# Patient Record
(demographics unavailable — no encounter records)

---

## 2025-03-11 NOTE — PHYSICAL EXAM
[No Acute Distress] : no acute distress [No Resp Distress] : no resp distress [No Acc Muscle Use] : no acc muscle use [Clear to Auscultation Bilaterally] : clear to auscultation bilaterally

## 2025-03-14 NOTE — PROCEDURE
[FreeTextEntry1] : 3/2025 severe mixed obstruction/restriction with bronchodilator response in [employer] better rv/tlc, reduced dlco that corrects    previous data reviewed:  severe obstruction on spirometry wo significant bronchodilator response. rv/tlc ratio high. severe dlco reduced 8/2024- CT chest noncontrast [facility]  PROCEDURE DATE:  05/09/2024    INTERPRETATION:  CLINICAL INFORMATION: History of pulmonary nodule, follow-up examination  COMPARISON: March 15, 2023  CONTRAST/COMPLICATIONS: IV Contrast: NONE Oral Contrast: NONE Complications: None reported at time of study completion  PROCEDURE: CT scan of the chest was obtained without intravenous contrast. Axial, coronal, sagittal and MIP images were produced and reviewed  FINDINGS:  LYMPH NODES: No enlarged mediastinal, hilar or axillary lymph nodes  HEART/VASCULATURE: No pericardial effusion. Thoracic aorta and main pulmonary artery are unchanged.  AIRWAYS/LUNGS/PLEURA: Trachea and central airways are patent. Diffuse emphysematous changes bilaterally. No parenchymal consolidation, pleural effusion or pneumothorax. No bronchiectasis or honeycombing.  Left basilar 1.2 cm partially spiculated density (series 4 image 880), new from prior. Left upper lobe 0.3 cm nodule (series 4 image 179), new from prior. Stable scattered additional bilateral nodules including right middle lobe 0.6 cm nodule (series 4 image 649).  UPPER ABDOMEN: Partially imaged upper abdomen demonstrates no acute abnormality. Pneumobilia present. Dilated CBD, unchanged.  BONES/SOFT TISSUES: No acute osseous abnormality  IMPRESSION:  Since March 15, 2023;  Left basilar 1.2 cm partially spiculated density, possibly atelectasis, new from prior. Left upper lobe 0.3 cm nodule, new from prior.  Consider short interval follow-up (3-6 months) to confirm stability/resolution.  --- End of Report ---  4/2024- severe copd  ______________________________   CONCLUSIONS:    1. Left ventricular cavity is normal in size. Left ventricular systolic function is normal with an ejection fraction of 57 % by Hernandez's method of disks.  2. Normal left ventricular diastolic function.  3. Normal right ventricular cavity size and normal systolic function. Tricuspid annular plane systolic excursion (TAPSE) is 2.0 cm (normal >=1.7 cm).  4. No significant valvular disease.  5. No pericardial effusion seen.  6. No prior echocardiogram is available for comparison.  ACC: [id number] EXAM:  XR CHEST AP OR PA 1V   ORDERED BY: ADILSON GUPTA   PROCEDURE DATE:  03/12/2024      INTERPRETATION:  CLINICAL INFORMATION: Shortness of breath.  TECHNIQUE: Frontal view of the chest.  COMPARISON: Multiple prior chest x-rays with most recent dated 3/15/2023.  CT chest 3/15/2023.  FINDINGS:  Heart: Heart size is normal. Pulmonary: No focal consolidation. Emphysematous changes with relative  paucity of lung markings in the right upper lung. No pneumothorax or  pleural effusion. Bones: No acute bony pathology.  IMPRESSION: No focal consolidation. Emphysema.    --- End of Report ---     KIRBY BAILON MD; Resident Radiologist This document has been electronically signed. MARGO BATISTA MD; Attending Radiologist This document has been electronically signed. Mar 12 2024  6:00PM previous data reviewed:  the labs were drawn in the office today pfts sept 2023- mixed obstruction/ restriction severe, air trapping, reduced dlco  CONCLUSIONS: 1. Normal mitral valve. Minimal mitral regurgitation. 2. Normal left ventricular internal dimensions and wall thicknesses. 3. Endocardium not well visualized; grossly normal left ventricular systolic function.  Endocardial visualization enhanced with intravenous injection of echo contrast (Definity). 4. Right ventricular enlargement with decreased right ventricular systolic function. 5. Inadequate tricuspid regurgitation Doppler envelope precludes estimation of RVSP.   < from: CT Angio Chest PE Protocol w/ IV Cont (03.15.23 @ 23:49) >  ACC: [id number] EXAM:  CT ANGIO CHEST PULM [facility]   ORDERED BY: ANALI WORTHY   PROCEDURE DATE:  03/15/2023      INTERPRETATION:  CLINICAL INFORMATION: Dyspnea.  COMPARISON: CTA chest 9/18/2020, CT chest 9/17/2018  CONTRAST/COMPLICATIONS: IV Contrast: Omnipaque 350  70 cc administered   30 cc discarded Oral Contrast: NONE Complications: None reported at time of study completion  PROCEDURE: CT Angiography of the Chest. Sagittal and coronal reformats were performed as well as 3D (MIP)  reconstructions.  FINDINGS:  LUNGS AND AIRWAYS: Patent central airways.  Emphysema. Few bilateral lung  nodules are unchanged from 9/17/2018. For example a right middle lobe  solid nodule (301, 86) measures 6 mm. PLEURA: No pleural effusion. MEDIASTINUM AND ROBERT: No lymphadenopathy. VESSELS: No pulmonary embolism. Reflux of contrast into the intrahepatic  IVC and hepatic veins HEART: Heart size is normal. No pericardial effusion. CHEST WALL AND LOWER NECK: Within normal limits. VISUALIZED UPPER ABDOMEN: Moderate intrahepatic biliary ductal dilation  and marked extrahepatic biliary ductal dilation with the common bile duct  measuring up to 2.0 cm in diameter, previously 1.6 cm. BONES: Degenerative changes.  IMPRESSION: No pulmonary embolism.  Reflux of contrast into the intrahepatic IVC and hepatic veins, which  reflects some degree of elevated right heart pressures.  Centrilobular emphysema.  Dilated CBD up to 2.0 cm, previously measuring 1.6 cm 9/17/2018. MRI/MRCP  can be performed for further evaluation.  --- End of Report ---   RHC with mildly elevated PASP, normal filling pressures, normal LHC  Patient has mild pulmonary hypertension with normal wedge pressure CO2 retention on ABG and normal coronaries Recommendations:  Patient has mild pulmonary hypertension with normal wedge pressure CO2 retention on ABG and normal coronaries Recommendations:     Patient has mild pulmonary hypertension with normal wedge pressure CO2 retention on ABG and normal coronaries Recommendations:   Medical therapy for COPD/emphysema and mild pulmonary hypertension. Cessation of smoking   Patient has mild pulmonary hypertension with normal wedge pressure CO2 retention on ABG and normal coronaries Recommendations:   Medical therapy for COPD/emphysema and mild pulmonary hypertension. Cessation of smoking

## 2025-03-14 NOTE — HISTORY OF PRESENT ILLNESS
[Current] : current [Never] : never [TextBox_4] : YANELIS KOENIG is a 51 year old female who presents for f/u  on hypercarbic resp failure on 24/ 02 - 2L was in [facility] from 3/12- 3/29/24 was in [facility] summer 2024 for COPD  has 2L 02 24/7 community surgical dme-      on trelegy back on [facility]   still smoking    not using vent at night

## 2025-03-14 NOTE — PROCEDURE
[FreeTextEntry1] : 3/2025 severe mixed obstruction/restriction with bronchodilator response in [employer] better rv/tlc, reduced dlco that corrects    previous data reviewed:  severe obstruction on spirometry wo significant bronchodilator response. rv/tlc ratio high. severe dlco reduced 8/2024- CT chest noncontrast [facility]ENRI  PROCEDURE DATE:  05/09/2024    INTERPRETATION:  CLINICAL INFORMATION: History of pulmonary nodule, follow-up examination  COMPARISON: March 15, 2023  CONTRAST/COMPLICATIONS: IV Contrast: NONE Oral Contrast: NONE Complications: None reported at time of study completion  PROCEDURE: CT scan of the chest was obtained without intravenous contrast. Axial, coronal, sagittal and MIP images were produced and reviewed  FINDINGS:  LYMPH NODES: No enlarged mediastinal, hilar or axillary lymph nodes  HEART/VASCULATURE: No pericardial effusion. Thoracic aorta and main pulmonary artery are unchanged.  AIRWAYS/LUNGS/PLEURA: Trachea and central airways are patent. Diffuse emphysematous changes bilaterally. No parenchymal consolidation, pleural effusion or pneumothorax. No bronchiectasis or honeycombing.  Left basilar 1.2 cm partially spiculated density (series 4 image 880), new from prior. Left upper lobe 0.3 cm nodule (series 4 image 179), new from prior. Stable scattered additional bilateral nodules including right middle lobe 0.6 cm nodule (series 4 image 649).  UPPER ABDOMEN: Partially imaged upper abdomen demonstrates no acute abnormality. Pneumobilia present. Dilated CBD, unchanged.  BONES/SOFT TISSUES: No acute osseous abnormality  IMPRESSION:  Since March 15, 2023;  Left basilar 1.2 cm partially spiculated density, possibly atelectasis, new from prior. Left upper lobe 0.3 cm nodule, new from prior.  Consider short interval follow-up (3-6 months) to confirm stability/resolution.  --- End of Report ---  4/2024- severe copd  ______________________________   CONCLUSIONS:    1. Left ventricular cavity is normal in size. Left ventricular systolic function is normal with an ejection fraction of 57 % by Hernandez's method of disks.  2. Normal left ventricular diastolic function.  3. Normal right ventricular cavity size and normal systolic function. Tricuspid annular plane systolic excursion (TAPSE) is 2.0 cm (normal >=1.7 cm).  4. No significant valvular disease.  5. No pericardial effusion seen.  6. No prior echocardiogram is available for comparison.  ACC: [id number] EXAM:  XR CHEST AP OR PA 1V   ORDERED BY: ADILSON GUPTA   PROCEDURE DATE:  03/12/2024      INTERPRETATION:  CLINICAL INFORMATION: Shortness of breath.  TECHNIQUE: Frontal view of the chest.  COMPARISON: Multiple prior chest x-rays with most recent dated 3/15/2023.  CT chest 3/15/2023.  FINDINGS:  Heart: Heart size is normal. Pulmonary: No focal consolidation. Emphysematous changes with relative  paucity of lung markings in the right upper lung. No pneumothorax or  pleural effusion. Bones: No acute bony pathology.  IMPRESSION: No focal consolidation. Emphysema.    --- End of Report ---     KIRBY BAILON MD; Resident Radiologist This document has been electronically signed. MARGO BATISTA MD; Attending Radiologist This document has been electronically signed. Mar 12 2024  6:00PM previous data reviewed:  the labs were drawn in the office today pfts sept 2023- mixed obstruction/ restriction severe, air trapping, reduced dlco  CONCLUSIONS: 1. Normal mitral valve. Minimal mitral regurgitation. 2. Normal left ventricular internal dimensions and wall thicknesses. 3. Endocardium not well visualized; grossly normal left ventricular systolic function.  Endocardial visualization enhanced with intravenous injection of echo contrast (Definity). 4. Right ventricular enlargement with decreased right ventricular systolic function. 5. Inadequate tricuspid regurgitation Doppler envelope precludes estimation of RVSP.   < from: CT Angio Chest PE Protocol w/ IV Cont (03.15.23 @ 23:49) >  ACC: [id number] EXAM:  CT ANGIO CHEST PULM [facility]   ORDERED BY: ANALI WORTHY   PROCEDURE DATE:  03/15/2023      INTERPRETATION:  CLINICAL INFORMATION: Dyspnea.  COMPARISON: CTA chest 9/18/2020, CT chest 9/17/2018  CONTRAST/COMPLICATIONS: IV Contrast: Omnipaque 350  70 cc administered   30 cc discarded Oral Contrast: NONE Complications: None reported at time of study completion  PROCEDURE: CT Angiography of the Chest. Sagittal and coronal reformats were performed as well as 3D (MIP)  reconstructions.  FINDINGS:  LUNGS AND AIRWAYS: Patent central airways.  Emphysema. Few bilateral lung  nodules are unchanged from 9/17/2018. For example a right middle lobe  solid nodule (301, 86) measures 6 mm. PLEURA: No pleural effusion. MEDIASTINUM AND ROBERT: No lymphadenopathy. VESSELS: No pulmonary embolism. Reflux of contrast into the intrahepatic  IVC and hepatic veins HEART: Heart size is normal. No pericardial effusion. CHEST WALL AND LOWER NECK: Within normal limits. VISUALIZED UPPER ABDOMEN: Moderate intrahepatic biliary ductal dilation  and marked extrahepatic biliary ductal dilation with the common bile duct  measuring up to 2.0 cm in diameter, previously 1.6 cm. BONES: Degenerative changes.  IMPRESSION: No pulmonary embolism.  Reflux of contrast into the intrahepatic IVC and hepatic veins, which  reflects some degree of elevated right heart pressures.  Centrilobular emphysema.  Dilated CBD up to 2.0 cm, previously measuring 1.6 cm 9/17/2018. MRI/MRCP  can be performed for further evaluation.  --- End of Report ---   RHC with mildly elevated PASP, normal filling pressures, normal LHC  Patient has mild pulmonary hypertension with normal wedge pressure CO2 retention on ABG and normal coronaries Recommendations:  Patient has mild pulmonary hypertension with normal wedge pressure CO2 retention on ABG and normal coronaries Recommendations:     Patient has mild pulmonary hypertension with normal wedge pressure CO2 retention on ABG and normal coronaries Recommendations:   Medical therapy for COPD/emphysema and mild pulmonary hypertension. Cessation of smoking   Patient has mild pulmonary hypertension with normal wedge pressure CO2 retention on ABG and normal coronaries Recommendations:   Medical therapy for COPD/emphysema and mild pulmonary hypertension. Cessation of smoking

## 2025-03-14 NOTE — HISTORY OF PRESENT ILLNESS
[Current] : current [Never] : never [TextBox_4] : YANELIS KOENIG is a 51 year old female who presents for f/u  on hypercarbic resp failure on 24/ 02 - 2L was in [facility] from 3/12- 3/29/24 was in [facility] summer 2024 for COPD  has 2L 02 24/7 community surgical dme-      on trelegy back on [facility]   still smoking    not using vent at night